# Patient Record
Sex: MALE
[De-identification: names, ages, dates, MRNs, and addresses within clinical notes are randomized per-mention and may not be internally consistent; named-entity substitution may affect disease eponyms.]

---

## 2023-05-18 ENCOUNTER — NURSE TRIAGE (OUTPATIENT)
Dept: OTHER | Facility: CLINIC | Age: 73
End: 2023-05-18

## 2023-05-18 NOTE — TELEPHONE ENCOUNTER
Location of patient: [facility] MRN: [id number]    Provider: Luz Salguero NP    Current Symptoms: Pt reports dysuria and dark urine. He had a UTI recently and he was started on antibiotics. Associated Symptoms: Weakness    Pain Severity: 5 out of 10    Temperature: Denies fever    Recommended disposition: See in Office Today    Care advice provided, patient verbalizes understanding; denies any other questions or concerns. Outcome: The Scheduling department is temporarily closed for a meeting. Recommended that he call back. If he is unable to schedule, recommended that he visit his local Urgent Care.      This triage is a result of a call to the [place] [id number]    Reason for Disposition   All other males with painful urination, or patient wants to be seen    Protocols used: Urination Pain - Male-ADULT-[place]